# Patient Record
Sex: MALE | Race: BLACK OR AFRICAN AMERICAN | URBAN - METROPOLITAN AREA
[De-identification: names, ages, dates, MRNs, and addresses within clinical notes are randomized per-mention and may not be internally consistent; named-entity substitution may affect disease eponyms.]

---

## 2024-07-19 ENCOUNTER — APPOINTMENT (OUTPATIENT)
Dept: RADIOLOGY | Facility: HOSPITAL | Age: 34
End: 2024-07-19
Payer: COMMERCIAL

## 2024-07-19 ENCOUNTER — HOSPITAL ENCOUNTER (EMERGENCY)
Facility: HOSPITAL | Age: 34
Discharge: HOME | End: 2024-07-20
Attending: EMERGENCY MEDICINE
Payer: COMMERCIAL

## 2024-07-19 VITALS
RESPIRATION RATE: 18 BRPM | HEIGHT: 72 IN | DIASTOLIC BLOOD PRESSURE: 73 MMHG | SYSTOLIC BLOOD PRESSURE: 109 MMHG | OXYGEN SATURATION: 98 % | TEMPERATURE: 97.2 F | WEIGHT: 225 LBS | HEART RATE: 83 BPM | BODY MASS INDEX: 30.48 KG/M2

## 2024-07-19 DIAGNOSIS — S63.501A SPRAIN OF RIGHT WRIST, INITIAL ENCOUNTER: Primary | ICD-10-CM

## 2024-07-19 PROCEDURE — 99284 EMERGENCY DEPT VISIT MOD MDM: CPT

## 2024-07-19 PROCEDURE — 99284 EMERGENCY DEPT VISIT MOD MDM: CPT | Performed by: EMERGENCY MEDICINE

## 2024-07-19 RX ORDER — MORPHINE SULFATE 4 MG/ML
2 INJECTION INTRAVENOUS ONCE
Status: COMPLETED | OUTPATIENT
Start: 2024-07-19 | End: 2024-07-20

## 2024-07-19 ASSESSMENT — COLUMBIA-SUICIDE SEVERITY RATING SCALE - C-SSRS
6. HAVE YOU EVER DONE ANYTHING, STARTED TO DO ANYTHING, OR PREPARED TO DO ANYTHING TO END YOUR LIFE?: NO
2. HAVE YOU ACTUALLY HAD ANY THOUGHTS OF KILLING YOURSELF?: NO
1. IN THE PAST MONTH, HAVE YOU WISHED YOU WERE DEAD OR WISHED YOU COULD GO TO SLEEP AND NOT WAKE UP?: NO

## 2024-07-19 NOTE — Clinical Note
Waldo Garland was seen and treated in our emergency department on 7/19/2024.  He may return to work on 07/20/2024.       If you have any questions or concerns, please don't hesitate to call.      Lashon Almonte, DO

## 2024-07-20 ENCOUNTER — APPOINTMENT (OUTPATIENT)
Dept: RADIOLOGY | Facility: HOSPITAL | Age: 34
End: 2024-07-20
Payer: COMMERCIAL

## 2024-07-20 LAB
ALBUMIN SERPL BCP-MCNC: 4.6 G/DL (ref 3.4–5)
ALP SERPL-CCNC: 49 U/L (ref 33–120)
ALT SERPL W P-5'-P-CCNC: 36 U/L (ref 10–52)
ANION GAP SERPL CALC-SCNC: 15 MMOL/L (ref 10–20)
AST SERPL W P-5'-P-CCNC: 39 U/L (ref 9–39)
BASOPHILS # BLD AUTO: 0.06 X10*3/UL (ref 0–0.1)
BASOPHILS NFR BLD AUTO: 0.7 %
BILIRUB SERPL-MCNC: 0.7 MG/DL (ref 0–1.2)
BUN SERPL-MCNC: 19 MG/DL (ref 6–23)
CALCIUM SERPL-MCNC: 9.6 MG/DL (ref 8.6–10.6)
CHLORIDE SERPL-SCNC: 104 MMOL/L (ref 98–107)
CO2 SERPL-SCNC: 26 MMOL/L (ref 21–32)
CREAT SERPL-MCNC: 1.04 MG/DL (ref 0.5–1.3)
EGFRCR SERPLBLD CKD-EPI 2021: >90 ML/MIN/1.73M*2
EOSINOPHIL # BLD AUTO: 0.07 X10*3/UL (ref 0–0.7)
EOSINOPHIL NFR BLD AUTO: 0.8 %
ERYTHROCYTE [DISTWIDTH] IN BLOOD BY AUTOMATED COUNT: 12.6 % (ref 11.5–14.5)
GLUCOSE SERPL-MCNC: 96 MG/DL (ref 74–99)
HCT VFR BLD AUTO: 38.5 % (ref 41–52)
HGB BLD-MCNC: 13.7 G/DL (ref 13.5–17.5)
IMM GRANULOCYTES # BLD AUTO: 0.02 X10*3/UL (ref 0–0.7)
IMM GRANULOCYTES NFR BLD AUTO: 0.2 % (ref 0–0.9)
LYMPHOCYTES # BLD AUTO: 3.09 X10*3/UL (ref 1.2–4.8)
LYMPHOCYTES NFR BLD AUTO: 34.1 %
MAGNESIUM SERPL-MCNC: 2.05 MG/DL (ref 1.6–2.4)
MCH RBC QN AUTO: 28.3 PG (ref 26–34)
MCHC RBC AUTO-ENTMCNC: 35.6 G/DL (ref 32–36)
MCV RBC AUTO: 80 FL (ref 80–100)
MONOCYTES # BLD AUTO: 0.8 X10*3/UL (ref 0.1–1)
MONOCYTES NFR BLD AUTO: 8.8 %
NEUTROPHILS # BLD AUTO: 5.02 X10*3/UL (ref 1.2–7.7)
NEUTROPHILS NFR BLD AUTO: 55.4 %
NRBC BLD-RTO: 0 /100 WBCS (ref 0–0)
PLATELET # BLD AUTO: 195 X10*3/UL (ref 150–450)
POTASSIUM SERPL-SCNC: 3.5 MMOL/L (ref 3.5–5.3)
PROT SERPL-MCNC: 7.9 G/DL (ref 6.4–8.2)
RBC # BLD AUTO: 4.84 X10*6/UL (ref 4.5–5.9)
SODIUM SERPL-SCNC: 141 MMOL/L (ref 136–145)
WBC # BLD AUTO: 9.1 X10*3/UL (ref 4.4–11.3)

## 2024-07-20 PROCEDURE — 90715 TDAP VACCINE 7 YRS/> IM: CPT

## 2024-07-20 PROCEDURE — 90471 IMMUNIZATION ADMIN: CPT

## 2024-07-20 PROCEDURE — 83735 ASSAY OF MAGNESIUM: CPT

## 2024-07-20 PROCEDURE — 2500000004 HC RX 250 GENERAL PHARMACY W/ HCPCS (ALT 636 FOR OP/ED)

## 2024-07-20 PROCEDURE — 84075 ASSAY ALKALINE PHOSPHATASE: CPT

## 2024-07-20 PROCEDURE — 73130 X-RAY EXAM OF HAND: CPT | Mod: RIGHT SIDE | Performed by: STUDENT IN AN ORGANIZED HEALTH CARE EDUCATION/TRAINING PROGRAM

## 2024-07-20 PROCEDURE — 36415 COLL VENOUS BLD VENIPUNCTURE: CPT

## 2024-07-20 PROCEDURE — 96375 TX/PRO/DX INJ NEW DRUG ADDON: CPT

## 2024-07-20 PROCEDURE — 73110 X-RAY EXAM OF WRIST: CPT | Mod: RIGHT SIDE | Performed by: STUDENT IN AN ORGANIZED HEALTH CARE EDUCATION/TRAINING PROGRAM

## 2024-07-20 PROCEDURE — 73130 X-RAY EXAM OF HAND: CPT | Mod: RT

## 2024-07-20 PROCEDURE — 96374 THER/PROPH/DIAG INJ IV PUSH: CPT

## 2024-07-20 PROCEDURE — 73110 X-RAY EXAM OF WRIST: CPT | Mod: RT

## 2024-07-20 PROCEDURE — 96361 HYDRATE IV INFUSION ADD-ON: CPT

## 2024-07-20 PROCEDURE — 85025 COMPLETE CBC W/AUTO DIFF WBC: CPT

## 2024-07-20 RX ORDER — ONDANSETRON HYDROCHLORIDE 2 MG/ML
4 INJECTION, SOLUTION INTRAVENOUS ONCE
Status: COMPLETED | OUTPATIENT
Start: 2024-07-20 | End: 2024-07-20

## 2024-07-20 RX ADMIN — MORPHINE SULFATE 2 MG: 4 INJECTION, SOLUTION INTRAMUSCULAR; INTRAVENOUS at 00:05

## 2024-07-20 RX ADMIN — ONDANSETRON 4 MG: 2 INJECTION INTRAMUSCULAR; INTRAVENOUS at 01:47

## 2024-07-20 RX ADMIN — TETANUS TOXOID, REDUCED DIPHTHERIA TOXOID AND ACELLULAR PERTUSSIS VACCINE, ADSORBED 0.5 ML: 5; 2.5; 8; 8; 2.5 SUSPENSION INTRAMUSCULAR at 00:16

## 2024-07-20 RX ADMIN — SODIUM CHLORIDE, POTASSIUM CHLORIDE, SODIUM LACTATE AND CALCIUM CHLORIDE 1000 ML: 600; 310; 30; 20 INJECTION, SOLUTION INTRAVENOUS at 00:05

## 2024-07-20 ASSESSMENT — PAIN DESCRIPTION - LOCATION: LOCATION: GENERALIZED

## 2024-07-20 ASSESSMENT — PAIN SCALES - GENERAL: PAINLEVEL_OUTOF10: 10 - WORST POSSIBLE PAIN

## 2024-07-20 NOTE — ED PROVIDER NOTES
History of Present Illness     History provided by: Patient  Limitations to History: None  External Records Reviewed with Brief Summary:     HPI:  Waldo Garland is a 34 y.o. male no significant past medical he presenting to the emergency department today after a furnace fell on his hand while he was moving.  Patient notes that he had the furnace drop on his right hand, had a laceration and was courting a significant amount of blood.  Patient is left-hand dominant.  Notes that this happened around 7 PM.  Also states that after arriving into the emergency department he felt like he was dehydrated and cramping.  Unsure if this was related to tetanus.  No other associated signs or symptoms reported at this time.    Physical Exam   Triage vitals:  T 36.2 °C (97.2 °F)  HR 83  /73  RR 18  O2 98 % None (Room air)    General: Awake, alert, in no acute distress  Eyes: Gaze conjugate.  No scleral icterus or injection  HENT: Normo-cephalic, atraumatic. No stridor  CV: Regular rate, regular rhythm. Radial pulses 2+ bilaterally  Resp: Breathing non-labored, speaking in full sentences.  Clear to auscultation bilaterally  GI: Soft, non-distended, non-tender. No rebound or guarding.  MSK/Extremities: No gross bony deformities. Moving all extremities  Skin: Warm. Appropriate color  Neuro: Alert. Oriented. Face symmetric. Speech is fluent.  Gross strength and sensation intact in b/l UE and LEs  Psych: Appropriate mood and affect    Medical Decision Making & ED Course   Medical Decision Makin y.o. male with no significant past medical presenting to the emergency department with concern for right hand pain and swelling after a furnace fell onto his hand.  Notes that he has some difficulty with range of motion of the hand due to pain and swelling.  On examination the patient has no significant laceration or bleeding.  Has some minimal dried blood around the nailbed of the first digit of the right hand.  Patient is  left-hand dominant.  X-rays were obtained to evaluate for any fractures.  Patient has no scaphoid tenderness.  2+ radial pulses bilaterally.  Preliminary ED reads in addition to my individual review of the x-rays showed no concern for fractures or foreign bodies.  Plan to discharge the patient home.  Follow-up with orthopedics and primary care referrals were placed.  Patient was also given a liter of fluids due to concern for subjective dehydration.  Was given 2 mg of morphine for pain.  Unsure of when his last tetanus shot was, Boostrix was given today.  Patient discharged home in stable condition with strict return precautions, and follow-up instructions for which he is agreeable.  Was provided instructions on brace use as well.  Patient is agreeable with plan.    Differential diagnoses considered include but are not limited to: Fracture of the wrist bones, fracture of the metacarpals, wrist sprain     Social Determinants of Health which Significantly Impact Care: None identified     EKG Independent Interpretation: See ED Course    Independent Result Review and Interpretation: See ED course    Chronic conditions affecting the patient's care: See HPI    The patient was discussed with the following consultants/services: None    Care Considerations: See [facility]    ED Course:  ED Course as of 07/20/24 1702   Sat Jul 20, 2024   0341 XR wrist right 3+ views  X-rays intermittently interpreted with no acute fracture or dislocation.  Patient placed in splint and given follow-up for primary care and orthopedics.  Discharged in stable condition. [SC]      ED Course User Index  [SC] Bonnie Cannon DO         Diagnoses as of 07/20/24 1702   Sprain of right wrist, initial encounter     Disposition   As a result of the work-up, the patient was discharged home.  he was informed of his diagnosis and instructed to come back with any concerns or worsening of condition.  he and was agreeable to the plan as discussed above.  he was given  the opportunity to ask questions.  All of the patient's questions were answered.    Procedures   Procedures      Seen and discussed with ED Attending  Lashon Almonte DO, PGY-2  Emergency Medicine     Lashon Almonte DO  Resident  07/20/24 4026       Lashon Almonte DO  Resident  07/20/24 5369

## 2024-07-20 NOTE — DISCHARGE INSTRUCTIONS
No fracture was noted, it is important for you to follow-up with orthopedic surgery service that I have referred you to.  I also referred you to your primary care doctor.  If you have fever, decreased range of motion in your hand, if the swelling does not go down or if you have symptoms that worsen please present back to the emergency department.  Additionally please take Tylenol and ibuprofen for your pain, you can alternate as below.  I have also provided you with a brace that you can use as needed.  You can keep this on and remove it as needed throughout the day until you follow-up with orthopedics.    I have provided you a prescription for both Tylenol and ibuprofen.  For better pain management and control you can alternate between the 2 every 3 hours.  For example you take Tylenol now 3 hours later you can take ibuprofen, 3 hours after that you can take Tylenol again, and 3 hours after that ibuprofen again and so on and so forth.  Please do not take the same medication back to back and try to keep a 6-hour gap between each Tylenol dose and each ibuprofen dose.  This should help provide better pain relief.

## 2024-07-20 NOTE — ED TRIAGE NOTES
RAYMOND VALENCIA is a 34y old M who presents to [facility] ED with a chief complaint of a laceration to R thumb after a furnace dropped on his hand. Bleeding controlled. Denies anticoagulant use. Uncertain of tetanus vaccination status. Incident occurred approximately 1-1.5 hours ago. ROMÁN

## 2024-09-16 ENCOUNTER — HOSPITAL ENCOUNTER (EMERGENCY)
Facility: HOSPITAL | Age: 34
Discharge: HOME | End: 2024-09-17
Attending: STUDENT IN AN ORGANIZED HEALTH CARE EDUCATION/TRAINING PROGRAM
Payer: COMMERCIAL

## 2024-09-16 ENCOUNTER — APPOINTMENT (OUTPATIENT)
Dept: RADIOLOGY | Facility: HOSPITAL | Age: 34
End: 2024-09-16
Payer: COMMERCIAL

## 2024-09-16 DIAGNOSIS — K40.90 RIGHT INGUINAL HERNIA: Primary | ICD-10-CM

## 2024-09-16 LAB
ALBUMIN SERPL BCP-MCNC: 4.9 G/DL (ref 3.4–5)
ALP SERPL-CCNC: 54 U/L (ref 33–120)
ALT SERPL W P-5'-P-CCNC: 25 U/L (ref 10–52)
ANION GAP SERPL CALC-SCNC: 13 MMOL/L (ref 10–20)
AST SERPL W P-5'-P-CCNC: 24 U/L (ref 9–39)
BASOPHILS # BLD AUTO: 0.06 X10*3/UL (ref 0–0.1)
BASOPHILS NFR BLD AUTO: 0.8 %
BILIRUB SERPL-MCNC: 0.8 MG/DL (ref 0–1.2)
BUN SERPL-MCNC: 12 MG/DL (ref 6–23)
CALCIUM SERPL-MCNC: 9.9 MG/DL (ref 8.6–10.6)
CHLORIDE SERPL-SCNC: 104 MMOL/L (ref 98–107)
CO2 SERPL-SCNC: 27 MMOL/L (ref 21–32)
CREAT SERPL-MCNC: 0.92 MG/DL (ref 0.5–1.3)
EGFRCR SERPLBLD CKD-EPI 2021: >90 ML/MIN/1.73M*2
EOSINOPHIL # BLD AUTO: 0.05 X10*3/UL (ref 0–0.7)
EOSINOPHIL NFR BLD AUTO: 0.6 %
ERYTHROCYTE [DISTWIDTH] IN BLOOD BY AUTOMATED COUNT: 13.1 % (ref 11.5–14.5)
GLUCOSE SERPL-MCNC: 85 MG/DL (ref 74–99)
HCT VFR BLD AUTO: 41.2 % (ref 41–52)
HGB BLD-MCNC: 14.3 G/DL (ref 13.5–17.5)
IMM GRANULOCYTES # BLD AUTO: 0.02 X10*3/UL (ref 0–0.7)
IMM GRANULOCYTES NFR BLD AUTO: 0.3 % (ref 0–0.9)
LACTATE SERPL-SCNC: 0.8 MMOL/L (ref 0.4–2)
LYMPHOCYTES # BLD AUTO: 2.84 X10*3/UL (ref 1.2–4.8)
LYMPHOCYTES NFR BLD AUTO: 35.7 %
MCH RBC QN AUTO: 28 PG (ref 26–34)
MCHC RBC AUTO-ENTMCNC: 34.7 G/DL (ref 32–36)
MCV RBC AUTO: 81 FL (ref 80–100)
MONOCYTES # BLD AUTO: 0.84 X10*3/UL (ref 0.1–1)
MONOCYTES NFR BLD AUTO: 10.6 %
NEUTROPHILS # BLD AUTO: 4.15 X10*3/UL (ref 1.2–7.7)
NEUTROPHILS NFR BLD AUTO: 52 %
NRBC BLD-RTO: 0 /100 WBCS (ref 0–0)
PLATELET # BLD AUTO: 202 X10*3/UL (ref 150–450)
POTASSIUM SERPL-SCNC: 3.8 MMOL/L (ref 3.5–5.3)
PROT SERPL-MCNC: 8.4 G/DL (ref 6.4–8.2)
RBC # BLD AUTO: 5.1 X10*6/UL (ref 4.5–5.9)
SODIUM SERPL-SCNC: 140 MMOL/L (ref 136–145)
WBC # BLD AUTO: 8 X10*3/UL (ref 4.4–11.3)

## 2024-09-16 PROCEDURE — 80053 COMPREHEN METABOLIC PANEL: CPT | Performed by: STUDENT IN AN ORGANIZED HEALTH CARE EDUCATION/TRAINING PROGRAM

## 2024-09-16 PROCEDURE — 93975 VASCULAR STUDY: CPT

## 2024-09-16 PROCEDURE — 83605 ASSAY OF LACTIC ACID: CPT | Performed by: STUDENT IN AN ORGANIZED HEALTH CARE EDUCATION/TRAINING PROGRAM

## 2024-09-16 PROCEDURE — 99285 EMERGENCY DEPT VISIT HI MDM: CPT | Mod: 25

## 2024-09-16 PROCEDURE — 2500000005 HC RX 250 GENERAL PHARMACY W/O HCPCS: Mod: SE | Performed by: STUDENT IN AN ORGANIZED HEALTH CARE EDUCATION/TRAINING PROGRAM

## 2024-09-16 PROCEDURE — 76870 US EXAM SCROTUM: CPT | Performed by: RADIOLOGY

## 2024-09-16 PROCEDURE — 85025 COMPLETE CBC W/AUTO DIFF WBC: CPT | Performed by: STUDENT IN AN ORGANIZED HEALTH CARE EDUCATION/TRAINING PROGRAM

## 2024-09-16 PROCEDURE — 2500000001 HC RX 250 WO HCPCS SELF ADMINISTERED DRUGS (ALT 637 FOR MEDICARE OP): Mod: SE | Performed by: STUDENT IN AN ORGANIZED HEALTH CARE EDUCATION/TRAINING PROGRAM

## 2024-09-16 PROCEDURE — 36415 COLL VENOUS BLD VENIPUNCTURE: CPT | Performed by: STUDENT IN AN ORGANIZED HEALTH CARE EDUCATION/TRAINING PROGRAM

## 2024-09-16 RX ORDER — ONDANSETRON 4 MG/1
4 TABLET, ORALLY DISINTEGRATING ORAL ONCE
Status: COMPLETED | OUTPATIENT
Start: 2024-09-16 | End: 2024-09-16

## 2024-09-16 RX ORDER — OXYCODONE AND ACETAMINOPHEN 5; 325 MG/1; MG/1
1 TABLET ORAL ONCE
Status: COMPLETED | OUTPATIENT
Start: 2024-09-16 | End: 2024-09-16

## 2024-09-16 SDOH — HEALTH STABILITY: MENTAL HEALTH: HAVE YOU ACTUALLY HAD ANY THOUGHTS OF KILLING YOURSELF?: NO

## 2024-09-16 SDOH — HEALTH STABILITY: MENTAL HEALTH: BEHAVIORAL HEALTH(WDL): WITHIN DEFINED LIMITS

## 2024-09-16 SDOH — HEALTH STABILITY: MENTAL HEALTH: HAVE YOU EVER DONE ANYTHING, STARTED TO DO ANYTHING, OR PREPARED TO DO ANYTHING TO END YOUR LIFE?: NO

## 2024-09-16 SDOH — HEALTH STABILITY: MENTAL HEALTH: HAVE YOU WISHED YOU WERE DEAD OR WISHED YOU COULD GO TO SLEEP AND NOT WAKE UP?: NO

## 2024-09-16 SDOH — HEALTH STABILITY: MENTAL HEALTH: SUICIDE ASSESSMENT: ADULT (C-SSRS)

## 2024-09-16 ASSESSMENT — PAIN SCALES - GENERAL
PAINLEVEL_OUTOF10: 5 - MODERATE PAIN
PAINLEVEL_OUTOF10: 8
PAINLEVEL_OUTOF10: 10 - WORST POSSIBLE PAIN

## 2024-09-16 ASSESSMENT — PAIN DESCRIPTION - LOCATION: LOCATION: GROIN

## 2024-09-16 ASSESSMENT — PAIN - FUNCTIONAL ASSESSMENT: PAIN_FUNCTIONAL_ASSESSMENT: 0-10

## 2024-09-16 ASSESSMENT — PAIN DESCRIPTION - PAIN TYPE: TYPE: ACUTE PAIN

## 2024-09-16 ASSESSMENT — PAIN DESCRIPTION - DESCRIPTORS: DESCRIPTORS: SQUEEZING

## 2024-09-16 NOTE — ED TRIAGE NOTES
"Patient states that he has been having intermittent testicular squeezing since he was diagnosed with a hernia \"about 10 years ago\"; he states that since Wednesday however, the \"hernia will be in the testicles and then it goes in and out of my abdomen\"; also endorsing testicular \"squeezing\" consistently since Wednesday; concerned with constipation as well, states he feels like that the hernia is blocking the intestines and it makes it so he \"can't push\", but he does still endorse regular bowel movements; endorses urinary frequency as well; states \"I am regularly nauseous randomly\", denies any emesis    PMHx seizures, not on medication   "

## 2024-09-16 NOTE — ED PROVIDER NOTES
"HPI   Chief Complaint   Patient presents with    Abdominal Pain    Groin Pain       HPI  34M patient who presents with several days of progressive worsening of right theodore-scrotal swelling. Reports that he was diagnosed with a hernia in the remote past, feels like starting on Wednesday that it was \"going in and out of abdomen\" into his scrotum, and has had persistent swelling that will not resolve for the past 2 days. Has never had an inability to decrease the swelling before, worsens with standing, does not improve markedly with laying down, which is new. Reports some urinary frequency, intermittent nausea but no vomiting.    Patient History   No past medical history on file.  No past surgical history on file.  No family history on file.  Social History     Tobacco Use    Smoking status: Not on file    Smokeless tobacco: Not on file   Substance Use Topics    Alcohol use: Not on file    Drug use: Not on file       Physical Exam   ED Triage Vitals [09/16/24 1455]   Temperature Heart Rate Respirations BP   36.7 °C (98.1 °F) 86 16 111/78      Pulse Ox Temp Source Heart Rate Source Patient Position   98 % Oral -- --      BP Location FiO2 (%)     -- --       Physical Exam  Vitals and nursing note reviewed.   Constitutional:       General: He is not in acute distress.     Appearance: He is well-developed.      Comments: Appears uncomfortable, but not in acute cardiorespiratory distress   HENT:      Head: Normocephalic and atraumatic.   Eyes:      Conjunctiva/sclera: Conjunctivae normal.   Cardiovascular:      Rate and Rhythm: Normal rate and regular rhythm.      Heart sounds: No murmur heard.  Pulmonary:      Effort: Pulmonary effort is normal. No respiratory distress.      Breath sounds: Normal breath sounds.   Abdominal:      Palpations: Abdomen is soft.      Tenderness: There is no abdominal tenderness.      Comments: Likely right inguinal hernia palpable, with testicles in normal lie, able to reduce the structure with " graded pressure in the right theodore-scrotum, but it popped right back out and patient pain continued. Negative cremasteric reflex.   Musculoskeletal:         General: No swelling.      Cervical back: Neck supple.   Skin:     General: Skin is warm and dry.      Capillary Refill: Capillary refill takes less than 2 seconds.   Neurological:      Mental Status: He is alert.   Psychiatric:         Mood and Affect: Mood normal.       ED Course & MDM            No data recorded     Yoder Coma Scale Score: 15 (09/16/24 1456 : Leah Pratt, MORENA)                   Medical Decision Making  34M patient who presents with progressive worsening right scrotal swelling and pain. Arrives HD stable and afebrile. I saw patient in the triage room given concern from triage nurse and found patient to have significant right scrotal swelling. I was able to momentarily reduce this area of swelling, making it highly likely that this is an inguinal hernia, but pain did not improve, and area of swelling recurred. With differential still broad (scrotal abscess vs scrotal cellulitis vs incarcerated hernia vs testicular torsion), sending for US and sending basic labs. I re-evaluated patient again while waiting in the radiology waiting room. Appears generally comfortable, ordering basic oral meds while waiting for work-up. Plan is when available for patient to be placed in a bed with attempt at formal reduction pending labs and re-eval. May need CT imaging and surgery consult if imaging is indeed consistent with clinically apparent incarcerated inguinal hernia. Low suspicion for strangulation given lack of skin changes, normal hemodynamics.    CI: Scrotal swelling/right testicular pain  Dispo: Signed out pending US and laboratory imaging, re-eval    Procedure  Procedures     Hernán Churchill MD  09/16/24 0081

## 2024-09-16 NOTE — Clinical Note
Waldo Garland was seen and treated in our emergency department on 9/16/2024.  He may return to work on 09/30/2024.       If you have any questions or concerns, please don't hesitate to call.      Nicki López MD

## 2024-09-17 ENCOUNTER — HOSPITAL ENCOUNTER (OUTPATIENT)
Facility: HOSPITAL | Age: 34
Setting detail: SURGERY ADMIT
End: 2024-09-17
Attending: SURGERY | Admitting: SURGERY
Payer: COMMERCIAL

## 2024-09-17 ENCOUNTER — APPOINTMENT (OUTPATIENT)
Dept: RADIOLOGY | Facility: HOSPITAL | Age: 34
End: 2024-09-17
Payer: COMMERCIAL

## 2024-09-17 VITALS
OXYGEN SATURATION: 99 % | TEMPERATURE: 98.1 F | WEIGHT: 224 LBS | RESPIRATION RATE: 17 BRPM | DIASTOLIC BLOOD PRESSURE: 79 MMHG | SYSTOLIC BLOOD PRESSURE: 117 MMHG | BODY MASS INDEX: 28.75 KG/M2 | HEART RATE: 65 BPM | HEIGHT: 74 IN

## 2024-09-17 DIAGNOSIS — K40.90 RIGHT INGUINAL HERNIA: Primary | ICD-10-CM

## 2024-09-17 PROCEDURE — 96374 THER/PROPH/DIAG INJ IV PUSH: CPT

## 2024-09-17 PROCEDURE — 2500000004 HC RX 250 GENERAL PHARMACY W/ HCPCS (ALT 636 FOR OP/ED): Mod: SE | Performed by: STUDENT IN AN ORGANIZED HEALTH CARE EDUCATION/TRAINING PROGRAM

## 2024-09-17 PROCEDURE — 74177 CT ABD & PELVIS W/CONTRAST: CPT

## 2024-09-17 PROCEDURE — 74177 CT ABD & PELVIS W/CONTRAST: CPT | Mod: FOREIGN READ | Performed by: RADIOLOGY

## 2024-09-17 PROCEDURE — 2550000001 HC RX 255 CONTRASTS: Mod: SE | Performed by: STUDENT IN AN ORGANIZED HEALTH CARE EDUCATION/TRAINING PROGRAM

## 2024-09-17 RX ORDER — MORPHINE SULFATE 4 MG/ML
4 INJECTION INTRAVENOUS ONCE
Status: COMPLETED | OUTPATIENT
Start: 2024-09-17 | End: 2024-09-17

## 2024-09-17 ASSESSMENT — PAIN SCALES - GENERAL
PAINLEVEL_OUTOF10: 8
PAINLEVEL_OUTOF10: 2

## 2024-09-17 NOTE — DISCHARGE INSTRUCTIONS
Follow-up with acute care surgery this week.    Return if you have:  Temperature greater than 100.4  Signs of infection: pus draining warmth redness  Severe uncontrolled pain  Patient is in color of skin  Blood in vomit, pee, or poop  Any other questions or concerns you may have after discharge    In an emergency, call 911 or go to an Emergency Department at a nearby hospital

## 2024-09-17 NOTE — CONSULTS
[facility]  ACUTE CARE SURGERY - HISTORY AND PHYSICAL / CONSULT    Patient Name: Waldo Garland  MRN: [id number]  Admit Date: 916  : 1990  AGE: 34 y.o.   GENDER: male  ==============================================================================  TODAY'S ASSESSMENT AND PLAN OF CARE:  Waldo Garland is a 34 yoM with no significant PMHx who presents for recurrent right inguinal hernia. Patient has had hernia for at least ten years. Had previously pursued elective repair but was unable due to work/other obligations. Patient over the past week has had more persistent symptoms that are limiting his ability to perform his job. Patient hernia remains easily reducible and CT scan findings consistent with right fat-containing inguinal hernia without any evidence of incarcerated bowel.     Recommendations:   -Given patient easily reducible hernia without signs of incarceration okay to be discharged with plans for outpatient follow up with [facility] for elective inguinal hernia repair   -Patient agreeable to plan and given contact information for [facility]   -Dispo per ED     Seen and d/w chief Dr. Aranda. D/w Dr. Essence Weldon MD  PGY-2 Gen Surg  [facility] [id number]    ==============================================================================  CHIEF COMPLAINT/REASON FOR CONSULT:  Waldo Garland is a 34 yoM with no significant PMHx who presents for recurrent right inguinal hernia.     Patient states that he has had a right inguinal hernia for the past ten years. Patient states that he intermittently has symptoms with the hernia such as constipation/nausea/pain but that these come and go as the hernia resolves. He has always been able to reduce the hernia but it often comes back out. Patient recently has been working a more labor intensive job and has been unable to work as well due to the discomfort of the hernia. Patient states that sometimes when hernia has been out in the past  "week he has felt feverish and fatigued but that overall he feels better when the hernia is reduced once again.     PAST MEDICAL HISTORY:   PMH:   No past medical history on file.    PSH: None  No past surgical history on file.  FH: No pertinent family history   No family history on file.  SOCIAL HISTORY:    Smoking: Everyday smoker (1/2 PPD)      Alcohol: Social EtOH    Drug use: Occasional marijuana usage less than once per week    MEDICATIONS: None  Prior to Admission medications    Not on File     ALLERGIES:   No Known Allergies    REVIEW OF SYSTEMS:  12 point ROS otherwise negative outside of HPI     PHYSICAL EXAM:  /57   Pulse 58   Temp 36.7 °C (98.1 °F) (Oral)   Resp 16   Ht 1.88 m (6' 2\")   Wt 102 kg (224 lb)   SpO2 100%   BMI 28.76 kg/m²   Constitutional: NAD, A&Ox3   Head/Neck: NCAT  Eyes: Anicteric   Cardiovascular: Regular rate and rhythm per peripheral palpation   Respiratory: Breathing comfortably on RA with symmetric chest rise   Abdominal: Soft, non tender, non-distended without rebound or guarding   : Large right inguinal hernia that is easily reducible but reoccurs with valsalva    Ext: MAEx4  Psych: Appropriate mood and affect       IMAGING SUMMARY:  (summary of findings, not a copy of dictation)  CT 9/17:   IMPRESSION:  Large fat-containing right inguinal hernia extending into the right  hemiscrotum.  No evidence of bowel involvement.    LABS:  Results from last 7 days   Lab Units 09/16/24  2135   WBC AUTO x10*3/uL 8.0   HEMOGLOBIN g/dL 14.3   HEMATOCRIT % 41.2   PLATELETS AUTO x10*3/uL 202   NEUTROS PCT AUTO % 52.0   LYMPHS PCT AUTO % 35.7   MONOS PCT AUTO % 10.6   EOS PCT AUTO % 0.6         Results from last 7 days   Lab Units 09/16/24  2135   SODIUM mmol/L 140   POTASSIUM mmol/L 3.8   CHLORIDE mmol/L 104   CO2 mmol/L 27   BUN mg/dL 12   CREATININE mg/dL 0.92   CALCIUM mg/dL 9.9   PROTEIN TOTAL g/dL 8.4*   BILIRUBIN TOTAL mg/dL 0.8   ALK PHOS U/L 54   ALT U/L 25   AST U/L 24 "   GLUCOSE mg/dL 85     Results from last 7 days   Lab Units 09/16/24  2135   BILIRUBIN TOTAL mg/dL 0.8             I have reviewed all laboratory and imaging results ordered/pertinent for this encounter.

## 2024-09-17 NOTE — PROGRESS NOTES
Emergency Department Transition of Care Note       Signout   I received Waldo Garland in signout from Dr. Cooper.  Please see the ED Provider Note for all HPI, PE and MDM up to the time of signout at 2300.  This is in addition to the primary record.    In brief Waldo Garland is an 34 y.o. male presenting for inguinal hernia. Was reduced x 2, however continued recurrence of hernia protrusion.    At the time of signout we were awaiting:  CT abdomen pelvis    ED Course & Medical Decision Making   Medical Decision Making:  Under my care, CT showing right inguinal hernia extending into right hemiscrotum. Less concern for incarceration or strangulation currently. Discussed inpatient versus outpatient management of hemiscrotum, however patient reports that he is having worsening pain and unable to work due to the worsening enlargement and pain of the inguinal hernia. [facility] was consulted for evaluation. Per [facility], will schedule appointment in the next week. Patient given work excuse. Patient amenable to the plan and discharged with [facility] follow-up, discharged in stable conditions with return precautions.    ED Course:  ED Course as of 09/17/24 0449   Mon Sep 16, 2024   2015 Ultrasound result reviewed showing trace bilateral hydroceles but no evidence of of testicular torsion or hernia. [RS]   2243 Triage note: This patient was signed out to me from prior attending who saw him in the waiting room for R scrotal pain/swelling, and inguinal hernia on exam. Labs and scrotal US were obtained, and scrotal US was unremarkable. Pt had prolonged stay in waiting room but requires repeat exam and determination if concern for incarcerated hernia.  [SS]   Tue Sep 17, 2024   0317 CT AP:  Large fat-containing right inguinal hernia extending into the right  hemiscrotum.  No evidence of bowel involvement.   [AT]      ED Course User Index  [AT] Nicki López MD  [RS] Leonides Cooper DO  [SS] Emani Daly MD         Diagnoses as of 09/17/24  0449   Right inguinal hernia       Disposition   As a result of the work-up, the patient was discharged home.  he was informed of his diagnosis and instructed to come back with any concerns or worsening of condition.  he and was agreeable to the plan as discussed above.  he was given the opportunity to ask questions.  All of the patient's questions were answered.    Procedures   Procedures    Patient seen and discussed with ED attending physician.    Nicki López MD  Emergency Medicine

## 2024-09-25 NOTE — PROGRESS NOTES
"Pharmacy Medication History Review Attempted    Waldo Garland is a 34 y.o. male who is planned to be admitted for Right inguinal hernia. Pharmacy attempted to call the patient prior to their scheduled procedure, but the patient was unable to be reached due to needing a new phone number on file. Both mobile phone numbers are either not working or is not their phone number.    Unable to confirm home medications with patient at this time. For a medication history on the day of admission, please contact the [facility] pharmacy by calling [phone] or [facility] \"[facility]\".    Preferred pharmacy and allergies to be confirmed with patient by nursing the day of procedure.     Charlotte Ivey    Meds Ambulatory [facility] Services   "

## 2024-10-04 ENCOUNTER — APPOINTMENT (OUTPATIENT)
Dept: RADIOLOGY | Facility: HOSPITAL | Age: 34
End: 2024-10-04
Payer: COMMERCIAL

## 2024-10-04 PROBLEM — S51.811A: Status: ACTIVE | Noted: 2024-10-04

## 2024-10-04 PROBLEM — S51.811A: Status: ACTIVE | Noted: 2024-10-03

## 2024-10-04 PROCEDURE — 73552 X-RAY EXAM OF FEMUR 2/>: CPT | Mod: RT

## 2024-10-04 PROCEDURE — 73090 X-RAY EXAM OF FOREARM: CPT | Mod: RT

## 2024-10-04 PROCEDURE — 70450 CT HEAD/BRAIN W/O DYE: CPT

## 2024-10-04 PROCEDURE — 73630 X-RAY EXAM OF FOOT: CPT | Mod: RT

## 2024-10-04 PROCEDURE — 72170 X-RAY EXAM OF PELVIS: CPT

## 2024-10-04 PROCEDURE — 72128 CT CHEST SPINE W/O DYE: CPT | Mod: RCN

## 2024-10-04 PROCEDURE — 72125 CT NECK SPINE W/O DYE: CPT

## 2024-10-04 PROCEDURE — 72131 CT LUMBAR SPINE W/O DYE: CPT | Mod: RCN

## 2024-10-04 PROCEDURE — 71260 CT THORAX DX C+: CPT

## 2024-10-04 PROCEDURE — 73206 CT ANGIO UPR EXTRM W/O&W/DYE: CPT | Mod: RT

## 2024-10-04 PROCEDURE — 71045 X-RAY EXAM CHEST 1 VIEW: CPT

## 2024-10-04 PROCEDURE — 73610 X-RAY EXAM OF ANKLE: CPT | Mod: RT

## 2024-10-06 ENCOUNTER — APPOINTMENT (OUTPATIENT)
Dept: RADIOLOGY | Facility: HOSPITAL | Age: 34
End: 2024-10-06
Payer: COMMERCIAL

## 2024-10-06 PROCEDURE — 73560 X-RAY EXAM OF KNEE 1 OR 2: CPT | Mod: LT

## 2024-10-07 ENCOUNTER — DOCUMENTATION (OUTPATIENT)
Dept: HOME HEALTH SERVICES | Facility: HOME HEALTH | Age: 34
End: 2024-10-07
Payer: COMMERCIAL

## 2024-10-07 ENCOUNTER — HOME HEALTH ADMISSION (OUTPATIENT)
Dept: HOME HEALTH SERVICES | Facility: HOME HEALTH | Age: 34
End: 2024-10-07
Payer: COMMERCIAL

## 2024-10-07 ENCOUNTER — PHARMACY VISIT (OUTPATIENT)
Dept: PHARMACY | Facility: CLINIC | Age: 34
End: 2024-10-07
Payer: MEDICAID

## 2024-10-07 PROCEDURE — RXMED WILLOW AMBULATORY MEDICATION CHARGE

## 2024-10-07 NOTE — HH CARE COORDINATION
Home Care received a Referral for Physical Therapy and Occupational Therapy. We have processed the referral for a Start of Care on 10.9.     If you have any questions or concerns, please feel free to contact us at [phone]. Follow the prompts, enter your five digit zip code, and you will be directed to your care team on CENTL 3.

## 2024-10-09 ENCOUNTER — HOME CARE VISIT (OUTPATIENT)
Dept: HOME HEALTH SERVICES | Facility: HOME HEALTH | Age: 34
End: 2024-10-09
Payer: COMMERCIAL

## 2024-10-09 PROCEDURE — G0151 HHCP-SERV OF PT,EA 15 MIN: HCPCS

## 2024-10-09 SDOH — ECONOMIC STABILITY: HOUSING INSECURITY
HOME SAFETY: THERAPIST DISCUSSED WITH PATIENT AND CAREGIVER IMPORTANCE OF HOME SAFETY, SPECIFICALLY FOR CLUTTER FREE WALKING PATHWAYS AND ADEQUATE LIGHTING

## 2024-10-09 SDOH — ECONOMIC STABILITY: HOUSING INSECURITY: UNSAFE COOKING RANGE AREA: 1

## 2024-10-09 SDOH — HEALTH STABILITY: PHYSICAL HEALTH: EXERCISE TYPE: SEATED HEP

## 2024-10-09 SDOH — ECONOMIC STABILITY: HOUSING INSECURITY: OPEN FLAME PRESENT: 1

## 2024-10-09 SDOH — HEALTH STABILITY: PHYSICAL HEALTH: EXERCISE ACTIVITIES SETS: 2

## 2024-10-09 SDOH — HEALTH STABILITY: PHYSICAL HEALTH: EXERCISE ACTIVITY: SEATED HEP

## 2024-10-09 SDOH — HEALTH STABILITY: PHYSICAL HEALTH: PHYSICAL EXERCISE: 15

## 2024-10-09 SDOH — HEALTH STABILITY: PHYSICAL HEALTH: EXERCISE COMMENTS: SEATED HEP INCLUDES ANKLE PUMPS, LAQS, HIP FLEXION, AND HIP ABDUCTION

## 2024-10-09 SDOH — HEALTH STABILITY: PHYSICAL HEALTH: PHYSICAL EXERCISE: SITTING

## 2024-10-09 ASSESSMENT — ACTIVITIES OF DAILY LIVING (ADL)
CURRENT_FUNCTION: INDEPENDENT
AMBULATION ASSISTANCE: 1
AMBULATION ASSISTANCE ON FLAT SURFACES: 1
OASIS_M1830: 05
AMBULATION ASSISTANCE: STAND BY ASSIST
ENTERING_EXITING_HOME: SUPERVISION
AMBULATION ASSISTANCE: ONE PERSON
PHYSICAL TRANSFERS ASSESSED: 1

## 2024-10-09 ASSESSMENT — ENCOUNTER SYMPTOMS
SUBJECTIVE PAIN PROGRESSION: WAXING AND WANING
PAIN LOCATION - PAIN FREQUENCY: FREQUENT
PAIN LOCATION - EXACERBATING FACTORS: NOTHING
LOWEST PAIN SEVERITY IN PAST 24 HOURS: 6/10
LOSS OF SENSATION IN FEET: 0
PERSON REPORTING PAIN: PATIENT
PAIN SEVERITY GOAL: 3/10
PAIN: 1
HIGHEST PAIN SEVERITY IN PAST 24 HOURS: 9/10
LIMITED RANGE OF MOTION: 1
PAIN LOCATION: RIGHT ARM
DEPRESSION: 0
ARTHRALGIAS: 1
MUSCLE WEAKNESS: 1
PAIN LOCATION - PAIN SEVERITY: 8/10
OCCASIONAL FEELINGS OF UNSTEADINESS: 1
PAIN LOCATION - RELIEVING FACTORS: MEDS

## 2024-10-15 ENCOUNTER — HOME CARE VISIT (OUTPATIENT)
Dept: HOME HEALTH SERVICES | Facility: HOME HEALTH | Age: 34
End: 2024-10-15
Payer: COMMERCIAL

## 2024-10-16 ENCOUNTER — HOME CARE VISIT (OUTPATIENT)
Dept: HOME HEALTH SERVICES | Facility: HOME HEALTH | Age: 34
End: 2024-10-16
Payer: COMMERCIAL

## 2024-10-16 PROCEDURE — G0151 HHCP-SERV OF PT,EA 15 MIN: HCPCS

## 2024-10-16 PROCEDURE — G0152 HHCP-SERV OF OT,EA 15 MIN: HCPCS

## 2024-10-16 SDOH — HEALTH STABILITY: PHYSICAL HEALTH: PHYSICAL EXERCISE: 15

## 2024-10-16 SDOH — HEALTH STABILITY: PHYSICAL HEALTH: EXERCISE ACTIVITY: DYNAMIC BALANCE HEP

## 2024-10-16 SDOH — HEALTH STABILITY: PHYSICAL HEALTH: EXERCISE ACTIVITIES SETS: 2

## 2024-10-16 SDOH — ECONOMIC STABILITY: HOUSING INSECURITY: UNSAFE COOKING RANGE AREA: 1

## 2024-10-16 SDOH — ECONOMIC STABILITY: HOUSING INSECURITY: OPEN FLAME PRESENT: 1

## 2024-10-16 SDOH — HEALTH STABILITY: PHYSICAL HEALTH: PHYSICAL EXERCISE: STANDING

## 2024-10-16 SDOH — HEALTH STABILITY: PHYSICAL HEALTH
EXERCISE COMMENTS: BALANCE PROGRAM ACTIVITIES INCLUDE:  SIDESTEPPING, FORWARD AND RETRO MARCHING, BACKWARD WALKING, TANDEM STANCE FOOT POSITIONS WITH ALTERNATING TRUNK ROTATIONS WITH REACHING, SINGLE LEG STANCE BALANCE ACTIVITIES

## 2024-10-16 SDOH — HEALTH STABILITY: PHYSICAL HEALTH: EXERCISE TYPE: DYNAMIC STANDING HEP

## 2024-10-16 ASSESSMENT — ENCOUNTER SYMPTOMS
DEPRESSION: 0
PERSON REPORTING PAIN: PATIENT
LIMITED RANGE OF MOTION: 1
ARTHRALGIAS: 1
SUBJECTIVE PAIN PROGRESSION: WAXING AND WANING
PAIN: 1
LOWEST PAIN SEVERITY IN PAST 24 HOURS: 6/10
PAIN LOCATION - PAIN FREQUENCY: FREQUENT
MUSCLE WEAKNESS: 1
PAIN LOCATION - PAIN SEVERITY: 8/10
PAIN LOCATION - RELIEVING FACTORS: MEDS
PAIN LOCATION: RIGHT ARM
OCCASIONAL FEELINGS OF UNSTEADINESS: 1
PAIN SEVERITY GOAL: 4/10
PAIN LOCATION - EXACERBATING FACTORS: ROM
PAIN: PATIENT DILIGENT WITH PAIN CONTROL TECHNIQUES
LOSS OF SENSATION IN FEET: 0
HIGHEST PAIN SEVERITY IN PAST 24 HOURS: 10/10

## 2024-10-16 ASSESSMENT — ACTIVITIES OF DAILY LIVING (ADL)
AMBULATION ASSISTANCE: STAND BY ASSIST
PHYSICAL TRANSFERS ASSESSED: 1
AMBULATION ASSISTANCE: ONE PERSON
AMBULATION_DISTANCE/DURATION_TOLERATED: 200 FEET WITH SUPERVISION AND NO DEVICE
CURRENT_FUNCTION: INDEPENDENT
AMBULATION ASSISTANCE ON FLAT SURFACES: 1
AMBULATION ASSISTANCE: 1

## 2024-10-17 ENCOUNTER — HOME CARE VISIT (OUTPATIENT)
Dept: HOME HEALTH SERVICES | Facility: HOME HEALTH | Age: 34
End: 2024-10-17
Payer: COMMERCIAL

## 2024-10-17 NOTE — HOME HEALTH
[facility] HOMECARE SERVICES SDOH  SDOH RESOURCE NAME:Heap/ V.O.C/ Visit Schedule  RESOURCE CONTACT:Online       MEDWIEUN( )  DENGAAA( )  UNITretickrS( )  [employer] BANK( )  DIGITAL/CONNECTION(x )  HOUSING( )  TRANSPORATION( )  SOCIAL CONNECTIONS(x)  STRESS/SUPPORT( )  UTLILITIES( x)  DEPRESSION( )  DRUG/ALCOHOL/TOBACCO ( )  FINANCIAL STRAIN(x )  FOOD INSECURITY( )  PASSPORT( )  PHONE/ADP PROGRAM( )  HOUSEHOLD/FURNITURE( )  ASSIT.LIVING( )     OTHER( )    FOLLOW UP: YES (x ) NO( ) Resources for Heap/ Utilities Help/Patient will get his own phone  ADDTIONAL COMMENT: Home Visit Schedule Tuesday 10/22/24 10am at [facility]

## 2024-10-18 ENCOUNTER — HOSPITAL ENCOUNTER (EMERGENCY)
Facility: HOSPITAL | Age: 34
Discharge: HOME | End: 2024-10-19
Attending: EMERGENCY MEDICINE
Payer: COMMERCIAL

## 2024-10-18 VITALS
HEART RATE: 82 BPM | RESPIRATION RATE: 18 BRPM | OXYGEN SATURATION: 100 % | WEIGHT: 220 LBS | HEIGHT: 74 IN | BODY MASS INDEX: 28.23 KG/M2 | DIASTOLIC BLOOD PRESSURE: 63 MMHG | TEMPERATURE: 96.8 F | SYSTOLIC BLOOD PRESSURE: 123 MMHG

## 2024-10-18 DIAGNOSIS — M62.838 MUSCLE SPASM: Primary | ICD-10-CM

## 2024-10-18 PROCEDURE — 99284 EMERGENCY DEPT VISIT MOD MDM: CPT

## 2024-10-18 ASSESSMENT — ACTIVITIES OF DAILY LIVING (ADL)
TELEPHONE USE ASSESSED: 1
BATHING_CURRENT_FUNCTION: MODERATE ASSIST
GROOMING ASSESSED: 1
ORAL_CARE_ASSESSED: 1
ORAL_CARE_CURRENT_FUNCTION: INDEPENDENT
TRANSPORTATION: DEPENDENT
FEEDING: MINIMUM ASSIST
BATHING ASSESSED: 1
HOUSEKEEPING ASSESSED: 1
TRANSPORTATION ASSESSED: 1
CURRENT_FUNCTION: MINIMUM ASSIST
TOILETING: 1
PHYSICAL TRANSFERS ASSESSED: 1
USING THE TELPHONE: INDEPENDENT
LAUNDRY: DEPENDENT
GROOMING_CURRENT_FUNCTION: MINIMUM ASSIST
SHOPPING: DEPENDENT
FEEDING ASSESSED: 1
LAUNDRY ASSESSED: 1
TOILETING: MINIMUM ASSIST
LIGHT HOUSEKEEPING: DEPENDENT
DRESSING_UB_CURRENT_FUNCTION: MINIMUM ASSIST
CURRENT_FUNCTION: CONTACT GUARD ASSIST
SHOPPING ASSESSED: 1
DRESSING_LB_CURRENT_FUNCTION: MODERATE ASSIST

## 2024-10-18 ASSESSMENT — ENCOUNTER SYMPTOMS
PAIN LOCATION: RIGHT SHOULDER
PAIN LOCATION - PAIN SEVERITY: 8/10
PAIN LOCATION - PAIN FREQUENCY: CONSTANT
LOWEST PAIN SEVERITY IN PAST 24 HOURS: 8/10
PAIN SEVERITY GOAL: 0/10
PAIN LOCATION - PAIN QUALITY: THROBBING
PAIN: 1
HIGHEST PAIN SEVERITY IN PAST 24 HOURS: 10/10
SUBJECTIVE PAIN PROGRESSION: WAXING AND WANING
PERSON REPORTING PAIN: PATIENT

## 2024-10-18 ASSESSMENT — COLUMBIA-SUICIDE SEVERITY RATING SCALE - C-SSRS
6. HAVE YOU EVER DONE ANYTHING, STARTED TO DO ANYTHING, OR PREPARED TO DO ANYTHING TO END YOUR LIFE?: NO
1. IN THE PAST MONTH, HAVE YOU WISHED YOU WERE DEAD OR WISHED YOU COULD GO TO SLEEP AND NOT WAKE UP?: NO
2. HAVE YOU ACTUALLY HAD ANY THOUGHTS OF KILLING YOURSELF?: NO

## 2024-10-18 ASSESSMENT — PAIN DESCRIPTION - PAIN TYPE: TYPE: ACUTE PAIN

## 2024-10-18 ASSESSMENT — PAIN SCALES - GENERAL: PAINLEVEL_OUTOF10: 7

## 2024-10-18 ASSESSMENT — PAIN DESCRIPTION - LOCATION: LOCATION: ARM

## 2024-10-18 ASSESSMENT — PAIN DESCRIPTION - ORIENTATION: ORIENTATION: RIGHT

## 2024-10-18 ASSESSMENT — PAIN - FUNCTIONAL ASSESSMENT: PAIN_FUNCTIONAL_ASSESSMENT: 0-10

## 2024-10-19 PROCEDURE — 2500000001 HC RX 250 WO HCPCS SELF ADMINISTERED DRUGS (ALT 637 FOR MEDICARE OP): Mod: SE

## 2024-10-19 RX ORDER — OXYCODONE HYDROCHLORIDE 5 MG/1
10 TABLET ORAL ONCE
Status: COMPLETED | OUTPATIENT
Start: 2024-10-19 | End: 2024-10-19

## 2024-10-19 RX ORDER — HYDROMORPHONE HYDROCHLORIDE 1 MG/ML
1 INJECTION, SOLUTION INTRAMUSCULAR; INTRAVENOUS; SUBCUTANEOUS ONCE
Status: DISCONTINUED | OUTPATIENT
Start: 2024-10-19 | End: 2024-10-19

## 2024-10-19 RX ORDER — CYCLOBENZAPRINE HCL 5 MG
5 TABLET ORAL 3 TIMES DAILY
Qty: 30 TABLET | Refills: 0 | Status: SHIPPED | OUTPATIENT
Start: 2024-10-19 | End: 2024-10-29

## 2024-10-19 RX ADMIN — OXYCODONE HYDROCHLORIDE 10 MG: 5 TABLET ORAL at 06:59

## 2024-10-19 ASSESSMENT — LIFESTYLE VARIABLES
HAVE YOU EVER FELT YOU SHOULD CUT DOWN ON YOUR DRINKING: NO
TOTAL SCORE: 0
EVER HAD A DRINK FIRST THING IN THE MORNING TO STEADY YOUR NERVES TO GET RID OF A HANGOVER: NO
EVER FELT BAD OR GUILTY ABOUT YOUR DRINKING: NO
HAVE PEOPLE ANNOYED YOU BY CRITICIZING YOUR DRINKING: NO

## 2024-10-19 NOTE — ED TRIAGE NOTES
Patient wants a dressing change for his right arm. Patient had surgery about three weeks ago for carpal tunnel and was supposed to see his surgeon on Tuesday but was unable to make that appointment.  
Hans De Leon(Attending)

## 2024-10-19 NOTE — PROGRESS NOTES
Emergency Department Transition of Care Note       Signout   I received Waldo Garland in signout from Dr. Cordova.  Please see the ED Provider Note for all HPI, PE and MDM up to the time of signout at 7am.  This is in addition to the primary record.    In brief Waldo Garland is an 34 y.o. male presenting for Patient is a 35-year-old male who presents today for evaluation of needing a dressing change after having carpal tunnel surgery on his right upper extremity a few weeks ago.  He missed his follow-up appointment due to a death in the family.     At the time of signout we were awaiting:  Ortho hand evaluation and recs    ED Course & Medical Decision Making   Medical Decision Making:  Under my care,  He was seen and evaluated by orthopedics this morning, cleared from an orthopedic standpoint.  Will be given Flexeril for pain, was given follow-up with Dr. Velasquez in 1 week.  Patient is stable for discharge at this time.    ED Course:  Diagnoses as of 10/19/24 0750   Muscle spasm       Disposition   As a result of the work-up, the patient was discharged home.  he was informed of his diagnosis and instructed to come back with any concerns or worsening of condition.  he and was agreeable to the plan as discussed above.  he was given the opportunity to ask questions.  All of the patient's questions were answered.    Procedures   Procedures        Iona Kay PA-C  Emergency Medicine
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
## 2024-10-19 NOTE — DISCHARGE INSTRUCTIONS
Please follow-up with orthopedic surgery in clinic in 2 weeks    Patient should follow-up with Dr. Velasquez in 1 week for outpatient evaluation. Appointments can be made by calling [phone].      --   Orthopedic Surgery Clinic   [facility]  Phone: [phone]
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
## 2024-10-19 NOTE — PROGRESS NOTES
Emergency Department Transition of Care Note       Signout   [facility]  Please see the ED Provider Note for all HPI, PE and MDM up to the time of signout at 3 am.  This is in addition to the primary record.    In brief Waldo Garland is an 34 y.o. male presenting for wound check with Ortho surgery.    At the time of signout we were awaiting:  Ortho hand recommendation    ED Course & Medical Decision Making   Medical Decision Making:  Under my care, Ortho hand consult still pending    ED Course:  Diagnoses as of 10/19/24 2238   Muscle spasm       Disposition   Patient was signed out to [place] at 7 AM pending completion of their work-up.  Please see the next provider's transition of care note for the remainder of the patient's care.     Procedures   Procedures    Patient seen and discussed with the ED physician.     Aixa Cordova MD  Emergency Medicine

## 2024-10-22 ENCOUNTER — HOME CARE VISIT (OUTPATIENT)
Dept: HOME HEALTH SERVICES | Facility: HOME HEALTH | Age: 34
End: 2024-10-22
Payer: COMMERCIAL
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
## 2024-10-23 ENCOUNTER — HOME CARE VISIT (OUTPATIENT)
Dept: HOME HEALTH SERVICES | Facility: HOME HEALTH | Age: 34
End: 2024-10-23
Payer: COMMERCIAL

## 2024-10-23 PROCEDURE — G0151 HHCP-SERV OF PT,EA 15 MIN: HCPCS

## 2024-10-23 SDOH — ECONOMIC STABILITY: HOUSING INSECURITY: OPEN FLAME PRESENT: 1

## 2024-10-23 SDOH — HEALTH STABILITY: PHYSICAL HEALTH: EXERCISE ACTIVITIES SETS: 2

## 2024-10-23 SDOH — HEALTH STABILITY: PHYSICAL HEALTH: PHYSICAL EXERCISE: STANDING

## 2024-10-23 SDOH — HEALTH STABILITY: PHYSICAL HEALTH: EXERCISE TYPE: STANDING HEP

## 2024-10-23 SDOH — HEALTH STABILITY: PHYSICAL HEALTH: PHYSICAL EXERCISE: 10

## 2024-10-23 SDOH — HEALTH STABILITY: PHYSICAL HEALTH: EXERCISE ACTIVITY: STANDING HEP

## 2024-10-23 SDOH — HEALTH STABILITY: PHYSICAL HEALTH
EXERCISE COMMENTS: STANDING HEP INCLUDES: CALF RAISES, KNEE FLEXION, HIP FLEXION, MARCHING, HIP ABDUCTION, HIP EXTENSION, AND MINI SQUATS

## 2024-10-23 ASSESSMENT — ACTIVITIES OF DAILY LIVING (ADL)
CURRENT_FUNCTION: INDEPENDENT
AMBULATION ASSISTANCE: 1
AMBULATION ASSISTANCE: INDEPENDENT
AMBULATION ASSISTANCE ON FLAT SURFACES: 1
PHYSICAL TRANSFERS ASSESSED: 1

## 2024-10-23 ASSESSMENT — ENCOUNTER SYMPTOMS
LIMITED RANGE OF MOTION: 1
LOSS OF SENSATION IN FEET: 0
PAIN LOCATION - PAIN FREQUENCY: FREQUENT
OCCASIONAL FEELINGS OF UNSTEADINESS: 0
PAIN: PATIENT DILIGENT WITH PAIN CONTROL
MUSCLE WEAKNESS: 1
PAIN LOCATION: RIGHT ARM
PAIN: 1
HIGHEST PAIN SEVERITY IN PAST 24 HOURS: 9/10
PAIN SEVERITY GOAL: 3/10
PAIN LOCATION - RELIEVING FACTORS: MEDS
LOWEST PAIN SEVERITY IN PAST 24 HOURS: 6/10
SUBJECTIVE PAIN PROGRESSION: WAXING AND WANING
DEPRESSION: 0
PAIN LOCATION - PAIN SEVERITY: 6/10
ARTHRALGIAS: 1
PERSON REPORTING PAIN: PATIENT

## 2024-10-26 ENCOUNTER — HOME CARE VISIT (OUTPATIENT)
Dept: HOME HEALTH SERVICES | Facility: HOME HEALTH | Age: 34
End: 2024-10-26
Payer: COMMERCIAL

## 2024-10-29 ENCOUNTER — HOME CARE VISIT (OUTPATIENT)
Dept: HOME HEALTH SERVICES | Facility: HOME HEALTH | Age: 34
End: 2024-10-29
Payer: COMMERCIAL

## 2024-10-30 ENCOUNTER — HOME CARE VISIT (OUTPATIENT)
Dept: HOME HEALTH SERVICES | Facility: HOME HEALTH | Age: 34
End: 2024-10-30
Payer: COMMERCIAL

## 2024-10-30 PROCEDURE — G0151 HHCP-SERV OF PT,EA 15 MIN: HCPCS

## 2024-10-30 SDOH — HEALTH STABILITY: PHYSICAL HEALTH: EXERCISE TYPE: STANDING HEP

## 2024-10-30 SDOH — ECONOMIC STABILITY: HOUSING INSECURITY: ENVIRONMENTAL RISKS: 1

## 2024-10-30 SDOH — HEALTH STABILITY: PHYSICAL HEALTH: PHYSICAL EXERCISE: STANDING

## 2024-10-30 SDOH — HEALTH STABILITY: PHYSICAL HEALTH: EXERCISE ACTIVITIES SETS: 2

## 2024-10-30 SDOH — HEALTH STABILITY: MENTAL HEALTH: STRESS FACTORS COMMENTS: ECENT STABBING INCIDENTRE

## 2024-10-30 SDOH — ECONOMIC STABILITY: HOUSING INSECURITY: OPEN FLAME PRESENT: 1

## 2024-10-30 SDOH — HEALTH STABILITY: PHYSICAL HEALTH: EXERCISE ACTIVITY: STANDING HEP

## 2024-10-30 SDOH — HEALTH STABILITY: PHYSICAL HEALTH: PHYSICAL EXERCISE: 10

## 2024-10-30 ASSESSMENT — ENCOUNTER SYMPTOMS
SUBJECTIVE PAIN PROGRESSION: WAXING AND WANING
PERSON REPORTING PAIN: PATIENT
HIGHEST PAIN SEVERITY IN PAST 24 HOURS: 9/10
PAIN SEVERITY GOAL: 4/10
PAIN LOCATION - PAIN SEVERITY: 7/10
AGITATION: 1
PAIN: 1
LOWEST PAIN SEVERITY IN PAST 24 HOURS: 6/10
PAIN LOCATION - EXACERBATING FACTORS: NOTHING
OCCASIONAL FEELINGS OF UNSTEADINESS: 0
LOSS OF SENSATION IN FEET: 0
PAIN: PATIENT DILIGENT WITH PAIN CONTROL TECHNIQUES
PAIN LOCATION - RELIEVING FACTORS: MEDS
PAIN LOCATION: RIGHT ARM
DEPRESSION: 0
PAIN LOCATION - PAIN FREQUENCY: FREQUENT

## 2024-10-30 ASSESSMENT — ACTIVITIES OF DAILY LIVING (ADL)
BATHING_REQUIRES_ASSISTANCE: 1
SHOPPING_REQUIRES_ASSISTANCE: 1
PHYSICAL TRANSFERS ASSESSED: 1
AMBULATION ASSISTANCE: INDEPENDENT
DRESSING_REQUIRES_ASSISTANCE: 1
AMBULATION ASSISTANCE: 1
CURRENT_FUNCTION: INDEPENDENT

## 2024-11-01 ENCOUNTER — PHARMACY VISIT (OUTPATIENT)
Dept: PHARMACY | Facility: CLINIC | Age: 34
End: 2024-11-01
Payer: MEDICAID

## 2024-11-01 ENCOUNTER — OFFICE VISIT (OUTPATIENT)
Dept: ORTHOPEDIC SURGERY | Facility: CLINIC | Age: 34
End: 2024-11-01
Payer: COMMERCIAL

## 2024-11-01 ENCOUNTER — TREATMENT (OUTPATIENT)
Dept: OCCUPATIONAL THERAPY | Facility: HOSPITAL | Age: 34
End: 2024-11-01
Payer: COMMERCIAL

## 2024-11-01 ENCOUNTER — HOME CARE VISIT (OUTPATIENT)
Dept: HOME HEALTH SERVICES | Facility: HOME HEALTH | Age: 34
End: 2024-11-01
Payer: COMMERCIAL

## 2024-11-01 VITALS — WEIGHT: 220 LBS | HEIGHT: 74 IN | BODY MASS INDEX: 28.23 KG/M2

## 2024-11-01 DIAGNOSIS — S51.811D: ICD-10-CM

## 2024-11-01 DIAGNOSIS — S51.811D: Primary | ICD-10-CM

## 2024-11-01 PROCEDURE — 99211 OFF/OP EST MAY X REQ PHY/QHP: CPT | Performed by: STUDENT IN AN ORGANIZED HEALTH CARE EDUCATION/TRAINING PROGRAM

## 2024-11-01 PROCEDURE — RXMED WILLOW AMBULATORY MEDICATION CHARGE

## 2024-11-01 PROCEDURE — L3808 WHFO, RIGID W/O JOINTS: HCPCS | Performed by: OCCUPATIONAL THERAPIST

## 2024-11-01 RX ORDER — OXYCODONE HYDROCHLORIDE 5 MG/1
5 TABLET ORAL EVERY 6 HOURS PRN
Qty: 15 TABLET | Refills: 0 | Status: SHIPPED | OUTPATIENT
Start: 2024-11-01 | End: 2024-11-08

## 2024-11-01 ASSESSMENT — PAIN DESCRIPTION - DESCRIPTORS: DESCRIPTORS: ACHING;SORE;SHARP

## 2024-11-01 ASSESSMENT — PAIN - FUNCTIONAL ASSESSMENT: PAIN_FUNCTIONAL_ASSESSMENT: 0-10

## 2024-11-01 ASSESSMENT — PAIN SCALES - GENERAL: PAINLEVEL_OUTOF10: 6

## 2024-11-02 ASSESSMENT — ACTIVITIES OF DAILY LIVING (ADL)
HOME_HEALTH_OASIS: 00
OASIS_M1830: 04

## 2024-11-16 DIAGNOSIS — S51.811D: ICD-10-CM

## 2024-11-16 RX ORDER — OXYCODONE HYDROCHLORIDE 5 MG/1
5 TABLET ORAL EVERY 6 HOURS PRN
Qty: 15 TABLET | Refills: 0 | Status: CANCELLED | OUTPATIENT
Start: 2024-11-16 | End: 2024-11-23

## 2024-11-18 ENCOUNTER — TELEPHONE (OUTPATIENT)
Dept: ORTHOPEDIC SURGERY | Facility: CLINIC | Age: 34
End: 2024-11-18

## 2024-11-18 NOTE — TELEPHONE ENCOUNTER
Copied from CRM #[id number]. Topic: Information Request - Prescription Refill FAQ  >> Nov 17, 2024  3:29 PM Andra ISABEL wrote:  Patient called in, advising that he left several voicemails for office for refill on pain medication (gabapentin and oxycodone), but he has not received refill.  Patient is having a lot of post op pain.  I was going to connect him to answering service, however, call dropped and I left .  could office follow up with patient tomorrow to make sure he was assisted?

## 2024-11-27 ENCOUNTER — APPOINTMENT (OUTPATIENT)
Dept: ORTHOPEDIC SURGERY | Facility: HOSPITAL | Age: 34
End: 2024-11-27
Payer: COMMERCIAL

## 2024-12-04 ENCOUNTER — PHARMACY VISIT (OUTPATIENT)
Dept: PHARMACY | Facility: CLINIC | Age: 34
End: 2024-12-04
Payer: MEDICAID

## 2024-12-04 ENCOUNTER — OFFICE VISIT (OUTPATIENT)
Dept: ORTHOPEDIC SURGERY | Facility: HOSPITAL | Age: 34
End: 2024-12-04
Payer: COMMERCIAL

## 2024-12-04 VITALS — WEIGHT: 220 LBS | BODY MASS INDEX: 28.23 KG/M2 | HEIGHT: 74 IN

## 2024-12-04 DIAGNOSIS — S51.811D: Primary | ICD-10-CM

## 2024-12-04 PROCEDURE — L3908 WHO COCK-UP NONMOLDE PRE OTS: HCPCS | Performed by: STUDENT IN AN ORGANIZED HEALTH CARE EDUCATION/TRAINING PROGRAM

## 2024-12-04 PROCEDURE — RXMED WILLOW AMBULATORY MEDICATION CHARGE

## 2024-12-04 PROCEDURE — 99211 OFF/OP EST MAY X REQ PHY/QHP: CPT | Performed by: STUDENT IN AN ORGANIZED HEALTH CARE EDUCATION/TRAINING PROGRAM

## 2024-12-04 RX ORDER — OXYCODONE HYDROCHLORIDE 5 MG/1
5 TABLET ORAL EVERY 6 HOURS PRN
Qty: 15 TABLET | Refills: 0 | Status: SHIPPED | OUTPATIENT
Start: 2024-12-04 | End: 2024-12-11

## 2024-12-04 RX ORDER — CYCLOBENZAPRINE HCL 10 MG
10 TABLET ORAL 3 TIMES DAILY PRN
Qty: 30 TABLET | Refills: 0 | Status: SHIPPED | OUTPATIENT
Start: 2024-12-04 | End: 2024-12-14
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
